# Patient Record
Sex: FEMALE | Race: WHITE
[De-identification: names, ages, dates, MRNs, and addresses within clinical notes are randomized per-mention and may not be internally consistent; named-entity substitution may affect disease eponyms.]

---

## 2020-03-30 ENCOUNTER — HOSPITAL ENCOUNTER (EMERGENCY)
Dept: HOSPITAL 52 - LL.ED | Age: 55
Discharge: HOME | End: 2020-03-30
Payer: COMMERCIAL

## 2020-03-30 VITALS — HEART RATE: 86 BPM | DIASTOLIC BLOOD PRESSURE: 90 MMHG | SYSTOLIC BLOOD PRESSURE: 174 MMHG

## 2020-03-30 DIAGNOSIS — Z88.5: ICD-10-CM

## 2020-03-30 DIAGNOSIS — J44.9: ICD-10-CM

## 2020-03-30 DIAGNOSIS — Z91.018: ICD-10-CM

## 2020-03-30 DIAGNOSIS — Z88.8: ICD-10-CM

## 2020-03-30 DIAGNOSIS — M41.9: ICD-10-CM

## 2020-03-30 DIAGNOSIS — Z88.2: ICD-10-CM

## 2020-03-30 DIAGNOSIS — N39.0: Primary | ICD-10-CM
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
## 2020-06-02 ENCOUNTER — HOSPITAL ENCOUNTER (EMERGENCY)
Dept: HOSPITAL 52 - LL.ED | Age: 55
Discharge: NURSING, REHAB OR LONG-TERM CARE | End: 2020-06-02
Payer: COMMERCIAL

## 2020-06-02 VITALS — DIASTOLIC BLOOD PRESSURE: 95 MMHG | HEART RATE: 79 BPM | SYSTOLIC BLOOD PRESSURE: 127 MMHG

## 2020-06-02 DIAGNOSIS — R79.1: ICD-10-CM

## 2020-06-02 DIAGNOSIS — Z91.018: ICD-10-CM

## 2020-06-02 DIAGNOSIS — R07.9: Primary | ICD-10-CM

## 2020-06-02 DIAGNOSIS — Z88.2: ICD-10-CM

## 2020-06-02 DIAGNOSIS — Z88.5: ICD-10-CM

## 2020-06-02 DIAGNOSIS — R79.89: ICD-10-CM

## 2020-06-02 DIAGNOSIS — Z88.6: ICD-10-CM

## 2020-06-02 DIAGNOSIS — F17.210: ICD-10-CM

## 2020-06-02 DIAGNOSIS — Z88.8: ICD-10-CM

## 2020-06-02 LAB
APTT PPP: 25.1 SEC (ref 24.5–32.8)
CHLORIDE SERPL-SCNC: 107 MMOL/L (ref 98–107)
SODIUM SERPL-SCNC: 143 MMOL/L (ref 136–145)
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
## 2020-07-15 ENCOUNTER — HOSPITAL ENCOUNTER (OUTPATIENT)
Dept: HOSPITAL 52 - LL.ED | Age: 55
Setting detail: OBSERVATION
LOS: 1 days | Discharge: HOME | End: 2020-07-16
Attending: EMERGENCY MEDICINE | Admitting: EMERGENCY MEDICINE
Payer: COMMERCIAL

## 2020-07-15 DIAGNOSIS — F41.8: ICD-10-CM

## 2020-07-15 DIAGNOSIS — K21.9: ICD-10-CM

## 2020-07-15 DIAGNOSIS — Z71.6: ICD-10-CM

## 2020-07-15 DIAGNOSIS — F17.210: ICD-10-CM

## 2020-07-15 DIAGNOSIS — E78.5: ICD-10-CM

## 2020-07-15 DIAGNOSIS — R79.89: ICD-10-CM

## 2020-07-15 DIAGNOSIS — Z88.6: ICD-10-CM

## 2020-07-15 DIAGNOSIS — Z87.19: ICD-10-CM

## 2020-07-15 DIAGNOSIS — I42.8: ICD-10-CM

## 2020-07-15 DIAGNOSIS — Z88.5: ICD-10-CM

## 2020-07-15 DIAGNOSIS — Z79.899: ICD-10-CM

## 2020-07-15 DIAGNOSIS — J43.1: ICD-10-CM

## 2020-07-15 DIAGNOSIS — R07.9: Primary | ICD-10-CM

## 2020-07-15 DIAGNOSIS — Z88.2: ICD-10-CM

## 2020-07-15 LAB
APTT PPP: 26.3 SEC (ref 24.5–32.8)
CHLORIDE SERPL-SCNC: 108 MMOL/L (ref 98–107)
SODIUM SERPL-SCNC: 141 MMOL/L (ref 136–145)

## 2020-07-15 PROCEDURE — C9113 INJ PANTOPRAZOLE SODIUM, VIA: HCPCS

## 2020-07-15 PROCEDURE — G0378 HOSPITAL OBSERVATION PER HR: HCPCS

## 2020-07-15 RX ADMIN — Medication PRN ML: at 09:12
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
## 2020-07-16 VITALS — SYSTOLIC BLOOD PRESSURE: 138 MMHG | HEART RATE: 62 BPM | DIASTOLIC BLOOD PRESSURE: 94 MMHG

## 2020-07-16 LAB
CHLORIDE SERPL-SCNC: 107 MMOL/L (ref 98–107)
SODIUM SERPL-SCNC: 142 MMOL/L (ref 136–145)

## 2020-07-16 RX ADMIN — Medication PRN ML: at 07:35

## 2020-07-16 NOTE — [id number]
**Discharge Summary





- Hospital Course


HPI Initial Comments: 





See emergency room note/admission H&P


Brief History: See emergency room note/admission H&P


Diagnosis: Stroke: No


Modified Lc Scale: No Symptoms at All


Modified [place] Scale Score: 0





- Discharge Data


Discharge Date: 07/16/20


Discharge Disposition: Home, Self-Care 01


Condition: Good





- Referral to Home Health


Primary Care Physician: 


Kortney Suggs PA-C








- Discharge Diagnosis/Problem(s)


(1) Chest pain


SNOMED Code(s): 84828477


   ICD Code: R07.9 - CHEST PAIN, UNSPECIFIED   Status: Acute   Priority: High   

Current Visit: Yes   Onset Date: 07/15/20   Problem Details: Negative workup for

acute MI. Note that [facility] EMR was reviewed by me today concerning recent 

possible MI on 6/2/20 with negative workup in that facility. Heart 

catheterization on 6/2/20 was completely normal with exception of decreased 

ejection fraction of 30% with echocardiogram on 6/2/20 indicating normal 

findings with exception of moderate apical ballooning consistent with Takatsubo 

syndrome with small localized apical thrombus with ejection fraction of 45% by 

that evaluation. Patient was placed on Eliquis at that time secondary to the 

thrombus and her above cardiomyopathy with follow-up appointment already 

scheduled with her cardiologist on 8/3/20 per their records. Note her previous 

d-dimer elevation secondary to the above small thrombus has since normalized 

with previous negative CTA of the chest for PE and negative venous Doppler 

studies of the lower extremities for DVT, which were both conducted at on 

6/2/20. [employer] work excuse was provided with the patient feeling that she can 

perform her normal duties.   


Qualifiers: 


   Chest pain type: unspecified   Qualified Code(s): R07.9 - Chest pain, 

unspecified   





(2) Cardiomyopathy


SNOMED Code(s): 21332246


   ICD Code: I42.9 - CARDIOMYOPATHY, UNSPECIFIED   Status: Chronic   Priority: 

High   Current Visit: Yes   Onset Date: 06/02/20   Problem Details: As above   


Qualifiers: 


   Cardiomyopathy type: other   Qualified Code(s): I42.8 - Other 

cardiomyopathies   





(3) Peptic reflux disease


SNOMED Code(s): 106160701


   ICD Code: K21.9 - GASTRO-ESOPHAGEAL REFLUX DISEASE WITHOUT ESOPHAGITIS   

Status: Chronic   Priority: Medium   Current Visit: Yes   Problem Details: Her 

current symptoms may be related to her peptic ulcer disease, although this has 

improved since recent initiation of Prilosec by her history. Further GI workup 

depending on her clinical course, including possible EGD, additional screening 

colonoscopy, if this has not already been conducted, etc.   





(4) Hypertension


SNOMED Code(s): 57037879


   ICD Code: I10 - ESSENTIAL (PRIMARY) HYPERTENSION   Status: Chronic   

Priority: Medium   Current Visit: Yes   Problem Details: Elevated diastolic 

blood pressures during this hospitalization. Continue to observe closely by her 

regular providers. No medication changes for now.   


Qualifiers: 


   Hypertension type: essential hypertension   Qualified Code(s): I10 - 

Essential (primary) hypertension   





(5) COPD (chronic obstructive pulmonary disease)


SNOMED Code(s): 47094344


   ICD Code: J44.9 - CHRONIC OBSTRUCTIVE PULMONARY DISEASE, UNSPECIFIED   

Status: Chronic   Priority: Medium   Current Visit: Yes   Problem Details: COPD 

by previous chest x-ray. No current fever or bronchitic type symptoms. No 

current medical therapy. Tobacco cessation strongly encouraged as below. PFTs 

also recommended.   


Qualifiers: 


   COPD type: emphysema   Emphysema type: panlobular   Qualified Code(s): J43.1 

- Panlobular emphysema   





(6) Hyperlipidemia


SNOMED Code(s): 25832388


   ICD Code: E78.5 - HYPERLIPIDEMIA, UNSPECIFIED   Status: Chronic   Priority: 

Medium   Current Visit: Yes   Problem Details: Currently under therapy. Weight 

loss in moderation. Continue to observe closely by her regular providers.   


Qualifiers: 


   Hyperlipidemia type: unspecified   Qualified Code(s): E78.5 - Hyperlipidemia,

unspecified   





(7) Mixed anxiety depressive disorder


SNOMED Code(s): 480781212


   ICD Code: F41.8 - OTHER SPECIFIED ANXIETY DISORDERS   Status: Chronic   

Priority: Medium   Current Visit: Yes   Problem Details: Stable by history.   





(8) Tobacco abuse counseling


SNOMED Code(s): 153781007, 329175274, 691331533


   ICD Code: Z71.6 - TOBACCO ABUSE COUNSELING   Status: Chronic   Priority: 

Medium   Current Visit: No   Problem Details: Patient actively trying to cut 

down cigarette use since [facility] admission 6/2/20. Nicoderm patch use during 

this hospitalization. The patient was congratulated about trying to quit smoking

with tobacco cessation information provided.   





(9) D-dimer, elevated


SNOMED Code(s): 992024377


   ICD Code: R79.89 - OTHER SPECIFIED ABNORMAL FINDINGS OF BLOOD CHEMISTRY   

Status: Acute   Priority: Medium   Current Visit: Yes   Onset Date: 06/02/20   

Problem Details: As above. D-dimer normal during this hospitalization. Continue 

current Eliquis therapy.   





- Patient Summary/Data


Operative Procedure(s) Performed: None


Complications: None


Consults: 


None


Labs Pending at D/C: 





None


Recommended Follow-up Testing/Procedures: 





As per discharge instructions


Planned Operative Procedure(s) after DC: None


Hospital Course: 





The patient was placed in observation status with negative workup for acute MI 

as above. Note previous extensive cardiac workup at [facility] on

6/2/20 as above with these records reviewed by me today. Patient is completely 

symptom-free at time of discharge and during this hospitalization with continued

close follow-up with the cardiology department at [facility] with no 

further medication.





- Patient Instructions


Diet: Heart Healthy Diet


Activity: As Tolerated


Driving: May Drive Today


Showering/Bathing: May Shower


Notify Provider of: Fever, Increased Pain, Nausea and/or Vomiting


Other/Special Instructions: 1.  Follow up with your regular provider in 10-14 

days as needed, if symptoms persist. Bring these discharge instructions with you

to that visit..  2.  Otherwise follow-up with your cardiologist as already 

scheduled on 8/3/20, including repeat echocardiogram, etc.  3.  Work excuse- See

Form.  4.  Congratulations about trying to quit smoking. Stop all tobacco use 

ASAP as directed/per provided information and consider contacting [employer], 

etc..  5.  Immediately after this visit verify that your cellular telephone's 

voicemail has been activated and is empty. Also verify that your  home 

telephone's answering machine is operating properly and has space to receive 

messages. Note that it is sometimes necessary for us to be able to contact you 

at a later date to discuss your medical care.  6.   Please remember that we are 

ALWAYS here for you and want to answer any questions you may have. Feel free to 

call the hospital any time and we call you back ASAP.  7.  Some evidence of 

beginning emphysema/COPD by chest x-ray. Stop smoking ASAP as above. Consider 

lung function test by your regular provider.  8.  Your lower blood pressure 

number was somewhat elevated, i.e., diastolic blood pressure. Continue current 

medical therapy for now with close follow-up by your regular provider, 

cardiologist, etc.  SYMPTOMS TO LOOK OUT FOR:  You have been hospitalized for 

your heart disease and should look out for the following symptoms after 

discharge:  1.  Make absolutely certain that you completely  understand the 

reasons you are taking any of your new and/or old medications and/or supplements

as discussed with you by the nurse at time of discharge. This includes possible 

side effects versus interactions between your medications and/or supplements. 

Don't be afraid to take extra time to ask any questions or express any concerns,

because that is what we are here for. It is very important to us that  you und

erstand your care.  2. Notify this facility, telephone number [phone], 

and/or your regular provider ASAP if you experience any of the following 

symptoms:  a.  Sudden chest pressure or pain especially with radiation to the 

neck, jaws, arms, mid back, etc. especially if these are associated with nausea,

cold sweats, dizziness, racing heart, weakness, near fainting, etc.  b.  Any 

shortness of breath or decreased exercise tolerance that has changed since your 

hospital discharge and is not normal for you.  c.  Any persistent heartburn type

symptoms that is not normal for you and is not relieved by any of your discharge

medications or supplements.  d.  Any progressive weight gain especially more 

than 5 pounds of weight gain prior to your scheduled appointment with your 

regular provider.  e.  Any racing heart, dizziness, etc. not associated with the

other symptoms as above.  f.   Any persistent fever equal to or greater than 

100.5 degrees, which does not respond to recommended doses of Tylenol, 

ibuprofen, Aleve, or other previously prescribed  fever medications





- Discharge Plan


*PRESCRIPTION DRUG MONITORING PROGRAM REVIEWED*: Not Applicable


*COPY OF PRESCRIPTION DRUG MONITORING REPORT IN PATIENT [place]: Not Applicable


Home Medications: 


                                    Home Meds





Multivitamin [Multi-Vitamin Daily] 1 each PO DAILY 04/12/17 [History]


Acetaminophen [Non-Aspirin Extra Strength] 1,000 mg PO Q6H PRN 06/28/18 

[History]


Apixaban [Eliquis] 5 mg PO BID 07/15/20 [History]


Omeprazole Magnesium [Prilosec Otc] 20 mg PO DAILY 07/15/20 [History]


atorvaSTATin [Lipitor] 40 mg PO BEDTIME 07/15/20 [History]


carvediloL [Carvedilol] 3.125 mg PO BID 07/15/20 [History]


lisinopriL [Lisinopril] 2.5 mg PO DAILY 07/15/20 [History]








Oxygen Therapy Mode: Room Air


Patient Handouts:  Health Risks of Smoking, Steps to Quit Smoking, Easy-to-Read,

Chronic Obstructive Pulmonary Disease, Easy-to-Read, Cardiomyopathy, Adult, 

Hypertension, Adult, Easy-to-Read, Gastroesophageal Reflux Disease, Adult, 

Easy-to-Read


Forms:  ED Department Discharge


Referrals: 


Kortney Suggs PA-C [Primary Care Provider] - 





- Discharge Summary/Plan Comment


DC Time >30 min.: Yes (Coordination of care )


Discharge Summary/Plan Comment: 


As above. Extensive precautions were given to the patient, who is in agreement 

with the treatment plan. See Patient Instructions for further treatment and 

plan.








- General Info


Date of Service: 07/16/20


Admission Dx/Problem (Free Text: 





Chest pain


Functional Status: Reports: Pain Controlled, Tolerating Diet, Ambulating.  

Denies: New Symptoms, Incentive Spirometry


Numeric/FACES Score: 0





- Review of Systems


General: Reports: No Symptoms.  Denies: Fever, Weakness, Fatigue, Malaise, 

Chills, Night Sweats, Appetite


HEENT: Reports: No Symptoms.  Denies: Dysphasia, Ear Pain, Eye Pain, Headaches, 

Post Nasal Drip, Sinus Congestion, Sore Throat, Visual Changes


Pulmonary: Reports: No Symptoms.  Denies: Shortness of Breath, Pleuritic Chest 

Pain, Cough, Sputum, Hemoptysis, Wheezing


Cardiovascular: Reports: Edema (Stable dependent).  Denies: Chest Pain, 

Palpitations, Dyspnea on Exertion, Orthopnea, PND, Lightheadedness


Gastrointestinal: Reports: Constipation (Stable by history), Other (No bowel 

movement since this hospitalization).  Denies: Abdominal Pain, Decreased 

Appetite, Diarrhea, Difficulty Swallowing, Flatus, Hematochezia, Melena, Nausea,

Vomiting


Genitourinary: Reports: No Symptoms.  Denies: Dysuria, Frequency, Burning, Pain,

Urgency, Incontinence, Hematuria, Retention, Flank Pain, Other


Musculoskeletal: Reports: No Symptoms.  Denies: Neck Pain, Shoulder Pain, Arm 

Pain, Back Pain, Leg Pain


Skin: Reports: No Symptoms.  Denies: Diaphoresis, Bruising, Pruritis


Neurological: Reports: No Symptoms.  Denies: Confusion, Dizziness, Headache, 

Numbness, Paresthesia, Tingling, Weakness


Psychiatric: Reports: No Symptoms.  Denies: Confusion, Depression, Anxiety, 

Agitation, Cravings, Hallucinations





- Patient Data


Vitals - Most Recent: 


                                Last Vital Signs











Temp  37.0 C   07/16/20 07:30


 


Pulse  62   07/16/20 07:34


 


Resp  16   07/16/20 07:30


 


BP  138/94 H  07/16/20 07:34


 


Pulse Ox  95   07/16/20 07:30








                               Vital Signs - 24 hr











  07/15/20 07/15/20 07/16/20





  17:50 17:53 00:00


 


Temperature [  36.6 C 37.1 C





Temporal]   


 


Pulse, 70  





Peripheral   


 


Pulse,  70 64





Peripheral [   





Pulse Oximetry]   


 


Respiratory  16 16





Rate   


 


Blood Pressure 120/73  


 


Blood Pressure  120/73 





[Left Upper Arm   





]   


 


Blood Pressure   115/74





[Right Upper   





Arm]   


 


O2 Sat by Pulse  94 L 95





Oximetry   














  07/16/20 07/16/20 07/16/20





  06:00 07:30 07:34


 


Temperature [ 36.8 C 37.0 C 





Temporal]   


 


Pulse,   62





Peripheral   


 


Pulse, 64 62 





Peripheral [   





Pulse Oximetry]   


 


Respiratory 16 16 





Rate   


 


Blood Pressure   138/94 H


 


Blood Pressure  138/94 H 





[Left Upper Arm   





]   


 


Blood Pressure 131/80  





[Right Upper   





Arm]   


 


O2 Sat by Pulse 95 95 





Oximetry   











Weight - Most Recent: 86.364 kg


I&O - Last 24 hours: 


                                 Intake & Output











 07/15/20 07/16/20 07/16/20





 22:59 06:59 14:59


 


Intake Total 120 300 600


 


Balance 120 300 600











Imaging Impressions - Last 24 hrs: 


Cardiac monitor showed normal sinus rhythm with heart rate in the 60s to 70s 

with no ectopy or arrhythmia


Lab Results - Last 24 hrs: 


                         Laboratory Results - last 24 hr











  07/15/20 07/15/20 07/16/20 Range/Units





  09:05 16:35 07:15 


 


WBC     (4.0-10.2)  K/uL


 


RBC     (3.77-5.09)  M/uL


 


Hgb     (11.7-15.5)  g/dL


 


Hct     (34.0-46.0)  %


 


MCV     (84.0-98.0)  fL


 


MCH     (28.2-33.3)  pg


 


MCHC     (31.7-36.0)  g/dL


 


RDW     (11.2-14.1)  %


 


Plt Count     (150-350)  K/uL


 


Neut % (Auto)     (45.0-80.0)  %


 


Lymph % (Auto)     (10.0-50.0)  %


 


Mono % (Auto)     (2.0-14.0)  %


 


Eos % (Auto)     (0.0-5.0)  %


 


Baso % (Auto)     (0.0-2.0)  %


 


Neut # (Auto)     (1.40-7.00)  K/uL


 


Lymph # (Auto)     (0.50-3.50)  K/uL


 


Mono # (Auto)     (0.00-1.00)  K/uL


 


Eos # (Auto)     (0.00-0.50)  K/uL


 


Baso # (Auto)     (0.00-0.20)  K/uL


 


Sodium    142  (136-145)  mmol/L


 


Potassium    3.9  (3.5-5.1)  mmol/L


 


Chloride    107  ()  mmol/L


 


Carbon Dioxide    28.2  (21.0-32.0)  mmol/L


 


BUN    21 H  (7-18)  mg/dL


 


Creatinine    0.71  (0.51-1.17)  mg/dL


 


Est Cr Clr Drug Dosing    87.56  mL/min


 


Estimated GFR (MDRD)    > 60  mL/min


 


Glucose    87  ()  mg/dL


 


Calcium    8.8  (8.5-10.1)  mg/dL


 


Creatine Kinase     ()  U/L


 


Creatine Kinase Index     (0.0-2.5)  %


 


CK-MB (CK-2)     (0.00-3.60)  ng/mL


 


Troponin I  0.000  0.000  0.000  (0.000-0.056)  ng/mL














  07/16/20 07/16/20 Range/Units





  07:15 07:15 


 


WBC  4.8   (4.0-10.2)  K/uL


 


RBC  4.22   (3.77-5.09)  M/uL


 


Hgb  13.0   (11.7-15.5)  g/dL


 


Hct  39.4   (34.0-46.0)  %


 


MCV  93.4   (84.0-98.0)  fL


 


MCH  30.8   (28.2-33.3)  pg


 


MCHC  33.0   (31.7-36.0)  g/dL


 


RDW  13.0   (11.2-14.1)  %


 


Plt Count  181   (150-350)  K/uL


 


Neut % (Auto)  55.7   (45.0-80.0)  %


 


Lymph % (Auto)  30.9   (10.0-50.0)  %


 


Mono % (Auto)  10.7   (2.0-14.0)  %


 


Eos % (Auto)  2.5   (0.0-5.0)  %


 


Baso % (Auto)  0.2   (0.0-2.0)  %


 


Neut # (Auto)  2.64   (1.40-7.00)  K/uL


 


Lymph # (Auto)  1.47   (0.50-3.50)  K/uL


 


Mono # (Auto)  0.51   (0.00-1.00)  K/uL


 


Eos # (Auto)  0.12   (0.00-0.50)  K/uL


 


Baso # (Auto)  0.01   (0.00-0.20)  K/uL


 


Sodium    (136-145)  mmol/L


 


Potassium    (3.5-5.1)  mmol/L


 


Chloride    ()  mmol/L


 


Carbon Dioxide    (21.0-32.0)  mmol/L


 


BUN    (7-18)  mg/dL


 


Creatinine    (0.51-1.17)  mg/dL


 


Est Cr Clr Drug Dosing    mL/min


 


Estimated GFR (MDRD)    mL/min


 


Glucose    ()  mg/dL


 


Calcium    (8.5-10.1)  mg/dL


 


Creatine Kinase   51  ()  U/L


 


Creatine Kinase Index   1.8  (0.0-2.5)  %


 


CK-MB (CK-2)   0.90  (0.00-3.60)  ng/mL


 


Troponin I    (0.000-0.056)  ng/mL








                                Laboratory Tests











  07/15/20 07/15/20 07/15/20 Range/Units





  02:45 02:45 02:45 


 


WBC  5.9    (4.0-10.2)  K/uL


 


RBC  4.03    (3.77-5.09)  M/uL


 


Hgb  12.3    (11.7-15.5)  g/dL


 


Hct  37.3    (34.0-46.0)  %


 


MCV  92.6    (84.0-98.0)  fL


 


MCH  30.5    (28.2-33.3)  pg


 


MCHC  33.0    (31.7-36.0)  g/dL


 


RDW  12.9    (11.2-14.1)  %


 


Plt Count  160    (150-350)  K/uL


 


Neut % (Auto)  64.0    (45.0-80.0)  %


 


Lymph % (Auto)  24.3    (10.0-50.0)  %


 


Mono % (Auto)  8.9    (2.0-14.0)  %


 


Eos % (Auto)  2.6    (0.0-5.0)  %


 


Baso % (Auto)  0.2    (0.0-2.0)  %


 


Neut # (Auto)  3.75    (1.40-7.00)  K/uL


 


Lymph # (Auto)  1.42    (0.50-3.50)  K/uL


 


Mono # (Auto)  0.52    (0.00-1.00)  K/uL


 


Eos # (Auto)  0.15    (0.00-0.50)  K/uL


 


Baso # (Auto)  0.01    (0.00-0.20)  K/uL


 


PT   10.6   (9.5-12.0)  SEC


 


INR   1.1   


 


APTT   26.3   (24.5-32.8)  SEC


 


D-Dimer, Quantitative     (0-400)  ng/mL


 


Sodium    141  (136-145)  mmol/L


 


Potassium    3.6  (3.5-5.1)  mmol/L


 


Chloride    108 H  ()  mmol/L


 


Carbon Dioxide    26.2  (21.0-32.0)  mmol/L


 


BUN    30 H  (7-18)  mg/dL


 


Creatinine    0.71  (0.51-1.17)  mg/dL


 


Est Cr Clr Drug Dosing    87.45  mL/min


 


Estimated GFR (MDRD)    > 60  mL/min


 


Glucose    121 H  ()  mg/dL


 


Lactic Acid     (0.4-2.0)  mmol/L


 


Calcium    8.6  (8.5-10.1)  mg/dL


 


Magnesium    1.9  (1.8-2.4)  mg/dL


 


Total Bilirubin    0.3  (0.2-1.0)  mg/dL


 


AST    16  (15-37)  U/L


 


ALT    25  (12-78)  U/L


 


Alkaline Phosphatase    93  ()  IU/L


 


Creatine Kinase    100  ()  U/L


 


Creatine Kinase Index    1.8  (0.0-2.5)  %


 


CK-MB (CK-2)    1.80  (0.00-3.60)  ng/mL


 


Troponin I    0.000  (0.000-0.056)  ng/mL


 


NT-Pro-B Natriuret Pep    123  (0-125)  pg/mL


 


Total Protein    6.5  (6.4-8.2)  g/dL


 


Albumin    3.6  (3.4-5.0)  g/dL


 


TSH, Ultra Sensitive    1.033  (0.358-3.740)  mIU/mL














  07/15/20 07/15/20 07/15/20 Range/Units





  02:45 02:45 09:05 


 


WBC     (4.0-10.2)  K/uL


 


RBC     (3.77-5.09)  M/uL


 


Hgb     (11.7-15.5)  g/dL


 


Hct     (34.0-46.0)  %


 


MCV     (84.0-98.0)  fL


 


MCH     (28.2-33.3)  pg


 


MCHC     (31.7-36.0)  g/dL


 


RDW     (11.2-14.1)  %


 


Plt Count     (150-350)  K/uL


 


Neut % (Auto)     (45.0-80.0)  %


 


Lymph % (Auto)     (10.0-50.0)  %


 


Mono % (Auto)     (2.0-14.0)  %


 


Eos % (Auto)     (0.0-5.0)  %


 


Baso % (Auto)     (0.0-2.0)  %


 


Neut # (Auto)     (1.40-7.00)  K/uL


 


Lymph # (Auto)     (0.50-3.50)  K/uL


 


Mono # (Auto)     (0.00-1.00)  K/uL


 


Eos # (Auto)     (0.00-0.50)  K/uL


 


Baso # (Auto)     (0.00-0.20)  K/uL


 


PT     (9.5-12.0)  SEC


 


INR     


 


APTT     (24.5-32.8)  SEC


 


D-Dimer, Quantitative  < 100    (0-400)  ng/mL


 


Sodium     (136-145)  mmol/L


 


Potassium     (3.5-5.1)  mmol/L


 


Chloride     ()  mmol/L


 


Carbon Dioxide     (21.0-32.0)  mmol/L


 


BUN     (7-18)  mg/dL


 


Creatinine     (0.51-1.17)  mg/dL


 


Est Cr Clr Drug Dosing     mL/min


 


Estimated GFR (MDRD)     mL/min


 


Glucose     ()  mg/dL


 


Lactic Acid   1.5   (0.4-2.0)  mmol/L


 


Calcium     (8.5-10.1)  mg/dL


 


Magnesium     (1.8-2.4)  mg/dL


 


Total Bilirubin     (0.2-1.0)  mg/dL


 


AST     (15-37)  U/L


 


ALT     (12-78)  U/L


 


Alkaline Phosphatase     ()  IU/L


 


Creatine Kinase     ()  U/L


 


Creatine Kinase Index     (0.0-2.5)  %


 


CK-MB (CK-2)     (0.00-3.60)  ng/mL


 


Troponin I    0.000  (0.000-0.056)  ng/mL


 


NT-Pro-B Natriuret Pep     (0-125)  pg/mL


 


Total Protein     (6.4-8.2)  g/dL


 


Albumin     (3.4-5.0)  g/dL


 


TSH, Ultra Sensitive     (0.358-3.740)  mIU/mL














  07/15/20 07/16/20 07/16/20 Range/Units





  16:35 07:15 07:15 


 


WBC    4.8  (4.0-10.2)  K/uL


 


RBC    4.22  (3.77-5.09)  M/uL


 


Hgb    13.0  (11.7-15.5)  g/dL


 


Hct    39.4  (34.0-46.0)  %


 


MCV    93.4  (84.0-98.0)  fL


 


MCH    30.8  (28.2-33.3)  pg


 


MCHC    33.0  (31.7-36.0)  g/dL


 


RDW    13.0  (11.2-14.1)  %


 


Plt Count    181  (150-350)  K/uL


 


Neut % (Auto)    55.7  (45.0-80.0)  %


 


Lymph % (Auto)    30.9  (10.0-50.0)  %


 


Mono % (Auto)    10.7  (2.0-14.0)  %


 


Eos % (Auto)    2.5  (0.0-5.0)  %


 


Baso % (Auto)    0.2  (0.0-2.0)  %


 


Neut # (Auto)    2.64  (1.40-7.00)  K/uL


 


Lymph # (Auto)    1.47  (0.50-3.50)  K/uL


 


Mono # (Auto)    0.51  (0.00-1.00)  K/uL


 


Eos # (Auto)    0.12  (0.00-0.50)  K/uL


 


Baso # (Auto)    0.01  (0.00-0.20)  K/uL


 


PT     (9.5-12.0)  SEC


 


INR     


 


APTT     (24.5-32.8)  SEC


 


D-Dimer, Quantitative     (0-400)  ng/mL


 


Sodium   142   (136-145)  mmol/L


 


Potassium   3.9   (3.5-5.1)  mmol/L


 


Chloride   107   ()  mmol/L


 


Carbon Dioxide   28.2   (21.0-32.0)  mmol/L


 


BUN   21 H   (7-18)  mg/dL


 


Creatinine   0.71   (0.51-1.17)  mg/dL


 


Est Cr Clr Drug Dosing   87.56   mL/min


 


Estimated GFR (MDRD)   > 60   mL/min


 


Glucose   87   ()  mg/dL


 


Lactic Acid     (0.4-2.0)  mmol/L


 


Calcium   8.8   (8.5-10.1)  mg/dL


 


Magnesium     (1.8-2.4)  mg/dL


 


Total Bilirubin     (0.2-1.0)  mg/dL


 


AST     (15-37)  U/L


 


ALT     (12-78)  U/L


 


Alkaline Phosphatase     ()  IU/L


 


Creatine Kinase     ()  U/L


 


Creatine Kinase Index     (0.0-2.5)  %


 


CK-MB (CK-2)     (0.00-3.60)  ng/mL


 


Troponin I  0.000  0.000   (0.000-0.056)  ng/mL


 


NT-Pro-B Natriuret Pep     (0-125)  pg/mL


 


Total Protein     (6.4-8.2)  g/dL


 


Albumin     (3.4-5.0)  g/dL


 


TSH, Ultra Sensitive     (0.358-3.740)  mIU/mL














  07/16/20 Range/Units





  07:15 


 


WBC   (4.0-10.2)  K/uL


 


RBC   (3.77-5.09)  M/uL


 


Hgb   (11.7-15.5)  g/dL


 


Hct   (34.0-46.0)  %


 


MCV   (84.0-98.0)  fL


 


MCH   (28.2-33.3)  pg


 


MCHC   (31.7-36.0)  g/dL


 


RDW   (11.2-14.1)  %


 


Plt Count   (150-350)  K/uL


 


Neut % (Auto)   (45.0-80.0)  %


 


Lymph % (Auto)   (10.0-50.0)  %


 


Mono % (Auto)   (2.0-14.0)  %


 


Eos % (Auto)   (0.0-5.0)  %


 


Baso % (Auto)   (0.0-2.0)  %


 


Neut # (Auto)   (1.40-7.00)  K/uL


 


Lymph # (Auto)   (0.50-3.50)  K/uL


 


Mono # (Auto)   (0.00-1.00)  K/uL


 


Eos # (Auto)   (0.00-0.50)  K/uL


 


Baso # (Auto)   (0.00-0.20)  K/uL


 


PT   (9.5-12.0)  SEC


 


INR   


 


APTT   (24.5-32.8)  SEC


 


D-Dimer, Quantitative   (0-400)  ng/mL


 


Sodium   (136-145)  mmol/L


 


Potassium   (3.5-5.1)  mmol/L


 


Chloride   ()  mmol/L


 


Carbon Dioxide   (21.0-32.0)  mmol/L


 


BUN   (7-18)  mg/dL


 


Creatinine   (0.51-1.17)  mg/dL


 


Est Cr Clr Drug Dosing   mL/min


 


Estimated GFR (MDRD)   mL/min


 


Glucose   ()  mg/dL


 


Lactic Acid   (0.4-2.0)  mmol/L


 


Calcium   (8.5-10.1)  mg/dL


 


Magnesium   (1.8-2.4)  mg/dL


 


Total Bilirubin   (0.2-1.0)  mg/dL


 


AST   (15-37)  U/L


 


ALT   (12-78)  U/L


 


Alkaline Phosphatase   ()  IU/L


 


Creatine Kinase  51  ()  U/L


 


Creatine Kinase Index  1.8  (0.0-2.5)  %


 


CK-MB (CK-2)  0.90  (0.00-3.60)  ng/mL


 


Troponin I   (0.000-0.056)  ng/mL


 


NT-Pro-B Natriuret Pep   (0-125)  pg/mL


 


Total Protein   (6.4-8.2)  g/dL


 


Albumin   (3.4-5.0)  g/dL


 


TSH, Ultra Sensitive   (0.358-3.740)  mIU/mL











MILLER Results - Last 24 hrs: 


None


Med Orders - Current: 


                               Current Medications





Acetaminophen (Tylenol Extra Strength)  1,000 mg PO Q6H PRN


   PRN Reason: Pain


Apixaban (Eliquis)  5 mg PO BID [facility]


   Last Admin: 07/16/20 07:35 Dose:  5 mg


   Documented by: 


Atorvastatin Calcium (Lipitor)  40 mg PO DAILY [facility]


   Last Admin: 07/16/20 07:35 Dose:  40 mg


   Documented by: 


Carvedilol (Coreg)  3.125 mg PO BID [facility]


   Last Admin: 07/16/20 07:34 Dose:  3.125 mg


   Documented by: 


Lisinopril (Prinivil)  2.5 mg PO DAILY [facility]


   Last Admin: 07/16/20 07:34 Dose:  2.5 mg


   Documented by: 


Nicotine (Habitrol)  21 mg TRDERM DAILY [facility]


   Last Admin: 07/16/20 07:35 Dose:  Not Given


   Documented by: 


Nitroglycerin (Nitrostat)  0.4 mg SL Q5M PRN


   PRN Reason: Chest Pain


Ondansetron HCl (Zofran)  4 mg IVPUSH Q6H PRN


   PRN Reason: Nausea/Vomiting


Sodium Chloride (Saline Flush)  10 ml FLUSH ASDIRECTED PRN


   PRN Reason: Keep Vein Open


   Last Admin: 07/16/20 07:35 Dose:  10 ml


   Documented by: 





Discontinued Medications





Atorvastatin Calcium (Lipitor)  40 mg PO BEDTIME [facility]


Nitroglycerin (Nitrostat)  0.4 mg SL ONETIME ONE


   Stop: 07/15/20 02:59


   Last Admin: 07/15/20 03:03 Dose:  0.4 mg


   Documented by: 


Pantoprazole Sodium (Protonix Iv***)  40 mg IVPUSH ONETIME ONE


   Stop: 07/15/20 09:01


   Last Admin: 07/15/20 09:12 Dose:  40 mg


   Documented by: 











- Exam


Quality Assessment: Reports: DVT Prophylaxis (Eliquis).  Denies: Supplemental 

Oxygen, Central Line/PICC, Urine Catheter, Skin Breakdown, Restraints


General: Reports: Alert, Oriented, Cooperative, No Acute Distress


HEENT: Reports: Pupils Equal, Pupils Reactive, EOMI, Mucous Membr. Moist/Pink.  

Denies: Scleral Icterus


Neck: Reports: Supple, Trachea Midline, No JVD, No Thyromegaly, +2 Carotid Pulse

wo Bruit.  Denies: Lymphadenopathy


Lungs: Reports: Clear to Auscultation, Normal Respiratory Effort.  Denies: Rub


Cardiovascular: Reports: Regular Rate, Regular Rhythm, No Murmurs.  Denies: G

allops, Rubs


GI/Abdominal Exam: Normal Bowel Sounds, Soft, Non-Tender, No Organomegaly, No 

Distention, No Abnormal Bruit, No Mass, Other (Obese).  No: Guarding


 (Female) Exam: Deferred


Rectal (Female) Exam: Deferred


Back Exam: Reports: Normal Inspection, Full Range of Motion.  Denies: CVA 

Tenderness (L), CVA Tenderness (R), Muscle Spasm


Extremities: Normal Range of Motion, Non-Tender, Normal Capillary Refill, Pedal 

Edema (Stable trace to +1 bilateral pedal/pretibial edema).  No: Gina's Sign


Skin: Reports: Warm, Dry, Intact.  Denies: Ecchymosis


Neurological: Reports: No New Focal Deficit


Psy/Mental Status: Reports: Alert, Normal Affect, Normal Mood.  Denies: 

Agitated, Hallucinations, Withdrawal Symptoms





EKG INTERPRETATION


EKG Date: 07/16/20


Time: 06:58


Rhythm: NSR


Rate (Beats/Min): 62


Axis: Normal (Neutral)


P-Wave: Present (Mild diffuse biphasic P waves of borderline pulmonary 

hypertension by EKG)


QRS: Normal (0.90 seconds)


ST-T: Other (Stable diffuse T-wave inversions in leads 13, aVF, and V2 through 

V6 both from 7/15/20 and 6/2/20.)


QT: Normal


FL/PQ Interval: 0.15 seconds


Comparison: No Change (As above)


EKG Interpretation Comments: 





1. No acute ischemic changes with stable diffuse T-wave inversions secondary to 

cardiomyopathy


2. Borderline pulmonary hypertension by EKG

## 2021-04-01 ENCOUNTER — HOSPITAL ENCOUNTER (EMERGENCY)
Dept: HOSPITAL 52 - LL.ED | Age: 56
Discharge: HOME | End: 2021-04-01
Payer: COMMERCIAL

## 2021-04-01 VITALS — HEART RATE: 66 BPM | DIASTOLIC BLOOD PRESSURE: 91 MMHG | SYSTOLIC BLOOD PRESSURE: 140 MMHG

## 2021-04-01 DIAGNOSIS — Z88.5: ICD-10-CM

## 2021-04-01 DIAGNOSIS — J44.9: ICD-10-CM

## 2021-04-01 DIAGNOSIS — R79.1: ICD-10-CM

## 2021-04-01 DIAGNOSIS — I10: ICD-10-CM

## 2021-04-01 DIAGNOSIS — J43.1: ICD-10-CM

## 2021-04-01 DIAGNOSIS — Z71.6: ICD-10-CM

## 2021-04-01 DIAGNOSIS — R10.13: Primary | ICD-10-CM

## 2021-04-01 DIAGNOSIS — M41.9: ICD-10-CM

## 2021-04-01 DIAGNOSIS — Z88.6: ICD-10-CM

## 2021-04-01 DIAGNOSIS — I50.9: ICD-10-CM

## 2021-04-01 DIAGNOSIS — Z91.018: ICD-10-CM

## 2021-04-01 DIAGNOSIS — F41.8: ICD-10-CM

## 2021-04-01 DIAGNOSIS — Z72.0: ICD-10-CM

## 2021-04-01 DIAGNOSIS — Z88.8: ICD-10-CM

## 2021-04-01 DIAGNOSIS — I11.0: ICD-10-CM

## 2021-04-01 DIAGNOSIS — Z79.899: ICD-10-CM

## 2021-04-01 DIAGNOSIS — Z88.2: ICD-10-CM

## 2021-04-01 LAB
APTT PPP: 24.3 SEC (ref 24.5–32.8)
CHLORIDE SERPL-SCNC: 105 MMOL/L (ref 98–107)
SODIUM SERPL-SCNC: 141 MMOL/L (ref 136–145)

## 2021-04-01 NOTE — EDM.PDOC
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 sometimes necessary for us to be able to contact you at a later date 

to discuss your medical care.


9.  Please remember that we are ALWAYS here for you and want to answer any 

questions you may have. Feel free to call the hospital any time and we call you 

back ASAP. 





Sepsis Event Note (ED)





- Focused Exam


Vital Signs: 


                                   Vital Signs











  Temp Pulse Resp BP Pulse Ox


 


 21 01:30   66  18  140/91 H  98


 


 21 01:15   65  17  153/82 H  99


 


 21 01:00   71  18   98


 


 21 00:45   70  16  156/97 H  97


 


 21 00:33  36.2 C  73  18  156/86 H  100














- Problem List & Annotations


(1) Abdominal pain


SNOMED Code(s): 93120809


   Code(s): R10.9 - UNSPECIFIED ABDOMINAL PAIN   Status: Acute   Priority: High 

 Current Visit: Yes   Onset Date: 21   Annotation/Comment:: Epigastric 

discomforts possibly secondary to her hiatal hernia with possible atypical 

anginal type symptoms.  Note completely clean coronary arteries by heart 

catheterization with left ventricular thrombus on 2029 rather than initially

suspected non-STEMI.  Chest pain protocol was initiated [facility] upon patient's 

arrival with negative work-up as above.  Secondary to D-dimer patient as below 

patient will be kept on 50% maximum exercise restriction and fall/injury 

precautions with [place] work excuse provided.  Overall good results with 

treatment in the emergency room.   


Qualifiers: 


   Abdominal location: epigastric   Qualified Code(s): R10.13 - Epigastric pain 

 





(2) D-dimer, elevated


SNOMED Code(s): 274671813


   Code(s): R79.89 - OTHER SPECIFIED ABNORMAL FINDINGS OF BLOOD CHEMISTRY   

Status: Acute   Priority: Medium   Current Visit: Yes   Onset Date: 20   

Annotation/Comment:: Various therapeutic options discussed with the patient, who

does not wish to have repeat CT of the chest with PE protocol at this time, 

which is very reasonable.  Note previous ventricular thrombus with resolution of

this thrombus by follow-up echocardiogram after previous Eliquis therapy.  

Secondary to returned D-dimer elevation a repeat echocardiogram will be perfor

med in this facility on 2021 with close follow-up by regular provider and 

further cardiology consultation depending on her clinical course.   





(3) Peptic reflux disease


SNOMED Code(s): 434845798


   Code(s): K21.9 - GASTRO-ESOPHAGEAL REFLUX DISEASE WITHOUT ESOPHAGITIS   

Status: Chronic   Priority: Medium   Current Visit: Yes   Annotation/Comment:: 

High-dose IV Pepcid and IV Protonix given as GI prophylaxis with improved 

symptoms prior to discharge.  No evidence of acute GI bleed.  Continue to 

observe closely by her regular provider.   





(4) Hypertension


SNOMED Code(s): 19319885


   Code(s): I10 - ESSENTIAL (PRIMARY) HYPERTENSION   Status: Chronic   Priority:

Medium   Current Visit: Yes   Annotation/Comment:: Somewhat elevated blood 

pressures in the emergency room secondary to her discomfort and anxious affect. 

Continue to observe closely by her regular providers. No medication changes for 

now.   


Qualifiers: 


   Hypertension type: essential hypertension   Qualified Code(s): I10 - 

Essential (primary) hypertension   





(5) Tobacco abuse counseling


SNOMED Code(s): 488828800, 642827809, 298162406


   Code(s): Z71.6 - TOBACCO ABUSE COUNSELING   Status: Chronic   Priority: 

Medium   Current Visit: Yes   Annotation/Comment:: Tobacco cessation once again 

strongly encouraged with information provided.   





(6) COPD (chronic obstructive pulmonary disease)


SNOMED Code(s): 40646619


   Code(s): J44.9 - CHRONIC OBSTRUCTIVE PULMONARY DISEASE, UNSPECIFIED   Status:

Chronic   Priority: Medium   Current Visit: Yes   Annotation/Comment:: COPD by 

previous chest x-ray. No current fever or bronchitic type symptoms. No current 

medical therapy. Tobacco cessation strongly encouraged as above with PFTs 

depending on her clinical course.   


Qualifiers: 


   COPD type: emphysema   Emphysema type: panlobular   Qualified Code(s): J43.1 

- Panlobular emphysema   





(7) Mixed anxiety depressive disorder


SNOMED Code(s): 557823017


   Code(s): F41.8 - OTHER SPECIFIED ANXIETY DISORDERS   Status: Acute   

Priority: Medium   Current Visit: Yes   Annotation/Comment:: Stable by history, 

although moderate control based on today's exam.  Continue to observe closely by

her regular provider.   





- Problem List Review


Problem List Initiated/Reviewed/Updated: Yes





- My Orders


Last 24 Hours: 


My Active Orders





21 00:38


Cardiac Monitoring [RC] .AS DIRECTED 


EKG Documentation Completion [RC] ASDIRECTED 


Oxygen Therapy, ED [RC] PRN 


Peripheral IV Care [RC] .AS DIRECTED 


Pulse Oximetry [RC] CONTINUOUS 


Up With Assistance [RC] PFP 


Vital Signs [RC] PFP 


Chest 1V Frontal [CR] Stat 


Sodium Chloride 0.9% [Saline Flush]   10 ml FLUSH ASDIRECTED PRN 


Obtain Past Medical Record [OM.PC] Urgent 


Peripheral IV Insertion Adult [OM.PC] Stat 


Resuscitation Status Stat 





21 Breakfast


Nothing per Oral Now Diet [DIET] 














- Assessment/Plan


Last 24 Hours: 


My Active Orders





21 00:38


Cardiac Monitoring [RC] .AS DIRECTED 


EKG Documentation Completion [RC] ASDIRECTED 


Oxygen Therapy, ED [RC] PRN 


Peripheral IV Care [RC] .AS DIRECTED 


Pulse Oximetry [RC] CONTINUOUS 


Up With Assistance [RC] PFP 


Vital Signs [RC] PFP 


Chest 1V Frontal [CR] Stat 


Sodium Chloride 0.9% [Saline Flush]   10 ml FLUSH ASDIRECTED PRN 


Obtain Past Medical Record [OM.PC] Urgent 


Peripheral IV Insertion Adult [OM.PC] Stat 


Resuscitation Status Stat 





21 Breakfast


Nothing per Oral Now Diet [DIET] 











Assessment:: 





As above


Plan: 





As above.  Extensive precautions were given to the patient, who is in agreement 

with the treatment plan.  See Patient Instructions for further treatment and 

plan.

## 2021-05-04 ENCOUNTER — HOSPITAL ENCOUNTER (EMERGENCY)
Dept: HOSPITAL 52 - LL.ED | Age: 56
LOS: 1 days | Discharge: HOME | End: 2021-05-05
Payer: COMMERCIAL

## 2021-05-04 DIAGNOSIS — E66.9: ICD-10-CM

## 2021-05-04 DIAGNOSIS — Z79.82: ICD-10-CM

## 2021-05-04 DIAGNOSIS — Z91.018: ICD-10-CM

## 2021-05-04 DIAGNOSIS — I11.0: ICD-10-CM

## 2021-05-04 DIAGNOSIS — Z88.8: ICD-10-CM

## 2021-05-04 DIAGNOSIS — Z79.899: ICD-10-CM

## 2021-05-04 DIAGNOSIS — R42: Primary | ICD-10-CM

## 2021-05-04 DIAGNOSIS — J44.9: ICD-10-CM

## 2021-05-04 DIAGNOSIS — Z88.5: ICD-10-CM

## 2021-05-04 DIAGNOSIS — Z88.6: ICD-10-CM

## 2021-05-04 DIAGNOSIS — K21.9: ICD-10-CM

## 2021-05-04 DIAGNOSIS — M19.90: ICD-10-CM

## 2021-05-04 DIAGNOSIS — I50.9: ICD-10-CM

## 2021-05-04 DIAGNOSIS — Z88.2: ICD-10-CM

## 2021-05-05 VITALS — DIASTOLIC BLOOD PRESSURE: 95 MMHG | HEART RATE: 62 BPM | SYSTOLIC BLOOD PRESSURE: 146 MMHG

## 2021-05-05 LAB
CHLORIDE SERPL-SCNC: 106 MMOL/L (ref 98–107)
SODIUM SERPL-SCNC: 141 MMOL/L (ref 136–145)
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
## 2022-08-18 ENCOUNTER — HOSPITAL ENCOUNTER (EMERGENCY)
Dept: HOSPITAL 52 - LL.ED | Age: 57
Discharge: HOME | End: 2022-08-18
Payer: COMMERCIAL

## 2022-08-18 VITALS — DIASTOLIC BLOOD PRESSURE: 76 MMHG | HEART RATE: 60 BPM | SYSTOLIC BLOOD PRESSURE: 116 MMHG

## 2022-08-18 DIAGNOSIS — R42: Primary | ICD-10-CM

## 2022-08-18 DIAGNOSIS — Z91.018: ICD-10-CM

## 2022-08-18 DIAGNOSIS — I11.0: ICD-10-CM

## 2022-08-18 DIAGNOSIS — Z79.899: ICD-10-CM

## 2022-08-18 DIAGNOSIS — Z79.82: ICD-10-CM

## 2022-08-18 DIAGNOSIS — Z88.8: ICD-10-CM

## 2022-08-18 DIAGNOSIS — Z88.2: ICD-10-CM

## 2022-08-18 DIAGNOSIS — E66.9: ICD-10-CM

## 2022-08-18 DIAGNOSIS — Z88.5: ICD-10-CM

## 2022-08-18 DIAGNOSIS — I50.9: ICD-10-CM

## 2022-08-18 DIAGNOSIS — J44.9: ICD-10-CM

## 2022-08-18 LAB
ANION GAP SERPL CALC-SCNC: 9 MEQ/L (ref 7–15)
CHLORIDE SERPL-SCNC: 106 MMOL/L (ref 98–107)
EGFRCR SERPLBLD CKD-EPI 2021: 81 ML/MIN (ref 60–?)
SODIUM SERPL-SCNC: 140 MMOL/L (ref 136–145)